# Patient Record
Sex: MALE | Race: BLACK OR AFRICAN AMERICAN | Employment: FULL TIME | URBAN - METROPOLITAN AREA
[De-identification: names, ages, dates, MRNs, and addresses within clinical notes are randomized per-mention and may not be internally consistent; named-entity substitution may affect disease eponyms.]

---

## 2024-03-12 ENCOUNTER — APPOINTMENT (OUTPATIENT)
Dept: GENERAL RADIOLOGY | Age: 24
End: 2024-03-12

## 2024-03-12 ENCOUNTER — HOSPITAL ENCOUNTER (EMERGENCY)
Age: 24
Discharge: HOME | End: 2024-03-12
Attending: EMERGENCY MEDICINE

## 2024-03-12 VITALS
SYSTOLIC BLOOD PRESSURE: 124 MMHG | HEART RATE: 87 BPM | OXYGEN SATURATION: 98 % | WEIGHT: 178 LBS | BODY MASS INDEX: 29.66 KG/M2 | HEIGHT: 65 IN | TEMPERATURE: 98.8 F | DIASTOLIC BLOOD PRESSURE: 86 MMHG | RESPIRATION RATE: 16 BRPM

## 2024-03-12 DIAGNOSIS — R05.9 COUGH, UNSPECIFIED TYPE: Primary | ICD-10-CM

## 2024-03-12 LAB
FLUAV RNA SPEC QL NAA+PROBE: NOT DETECTED
FLUBV RNA SPEC QL NAA+PROBE: NOT DETECTED
SARS-COV-2 RDRP RESP QL NAA+PROBE: NOT DETECTED
SOURCE: NORMAL

## 2024-03-12 PROCEDURE — 87502 INFLUENZA DNA AMP PROBE: CPT

## 2024-03-12 PROCEDURE — 87635 SARS-COV-2 COVID-19 AMP PRB: CPT

## 2024-03-12 PROCEDURE — 71046 X-RAY EXAM CHEST 2 VIEWS: CPT

## 2024-03-12 PROCEDURE — 99284 EMERGENCY DEPT VISIT MOD MDM: CPT

## 2024-03-12 RX ORDER — BENZONATATE 100 MG/1
100 CAPSULE ORAL 3 TIMES DAILY PRN
Qty: 30 CAPSULE | Refills: 0 | Status: SHIPPED | OUTPATIENT
Start: 2024-03-12 | End: 2024-03-12

## 2024-03-12 RX ORDER — ALBUTEROL SULFATE 90 UG/1
2 AEROSOL, METERED RESPIRATORY (INHALATION) EVERY 6 HOURS PRN
Qty: 18 G | Refills: 3 | Status: SHIPPED | OUTPATIENT
Start: 2024-03-12

## 2024-03-12 RX ORDER — BENZONATATE 100 MG/1
100 CAPSULE ORAL 3 TIMES DAILY PRN
Qty: 30 CAPSULE | Refills: 0 | Status: SHIPPED | OUTPATIENT
Start: 2024-03-12 | End: 2024-03-22

## 2024-03-12 ASSESSMENT — PAIN SCALES - GENERAL: PAINLEVEL_OUTOF10: 8

## 2024-03-12 ASSESSMENT — LIFESTYLE VARIABLES
HOW MANY STANDARD DRINKS CONTAINING ALCOHOL DO YOU HAVE ON A TYPICAL DAY: PATIENT DOES NOT DRINK
HOW OFTEN DO YOU HAVE A DRINK CONTAINING ALCOHOL: NEVER

## 2024-03-12 ASSESSMENT — PAIN - FUNCTIONAL ASSESSMENT: PAIN_FUNCTIONAL_ASSESSMENT: 0-10

## 2024-03-12 NOTE — DISCHARGE INSTRUCTIONS
Take cough medication as prescribed.  Follow-up with primary care physician.  Return to ED if symptoms worsen or progress in any way.  Take over-the-counter decongestant medication such as Mucinex DM.

## 2024-03-12 NOTE — ED NOTES
I have reviewed discharge instructions with the patient.  The patient verbalized understanding.    Patient left ED via Discharge Method: ambulatory to Home with SELF    Opportunity for questions and clarification provided.       Patient given 2 scripts.         To continue your aftercare when you leave the hospital, you may receive an automated call from our care team to check in on how you are doing.  This is a free service and part of our promise to provide the best care and service to meet your aftercare needs.” If you have questions, or wish to unsubscribe from this service please call [phone].  Thank you for Choosing our [facility] Emergency Department.

## 2024-03-12 NOTE — ED TRIAGE NOTES
Pt c/o Sob and throat pain for several days. When patient wakes up in the morning his throat hurts. Pt states that swallowing fluids \"takes my breath away.\" Respirations even and unlabored in triage. Pt was seen at [place] on Sunday for similar symptoms and syncope.

## 2024-03-13 ASSESSMENT — ENCOUNTER SYMPTOMS
WHEEZING: 1
COUGH: 1
CHEST TIGHTNESS: 0
STRIDOR: 0
NAUSEA: 0
BACK PAIN: 0
VOICE CHANGE: 0
TROUBLE SWALLOWING: 0
RHINORRHEA: 0
VOMITING: 0
ABDOMINAL PAIN: 0

## 2024-03-13 NOTE — ED PROVIDER NOTES
history.     Social History     Socioeconomic History    Marital status:      Spouse name: None    Number of children: None    Years of education: None    Highest education level: None   Tobacco Use    Smoking status: Never    Smokeless tobacco: Never   Substance and Sexual Activity    Alcohol use: Never    Drug use: Never        Discharge Medication List as of 3/12/2024  1:28 PM           Results for orders placed or performed during the hospital encounter of 03/12/24   COVID-19, Rapid    Specimen: Nasopharyngeal   Result Value Ref Range    Source Nasopharyngeal      SARS-CoV-2, Rapid Not detected NOTD     Influenza A/B, Molecular    Specimen: Nasopharyngeal   Result Value Ref Range    Influenza A, MER Not detected NOTD      Influenza B, MER Not detected NOTD     XR CHEST (2 VW)    Narrative    Chest X-ray    INDICATION: Productive cough.    COMPARISON:  None    TECHNIQUE: PA and lateral views of the chest were obtained.    FINDINGS: The lungs are clear. There are no infiltrates or effusions.  The heart  size is normal.  The bony thorax is intact.        Impression    No acute findings in the chest           XR CHEST (2 VW)   Final Result   No acute findings in the chest                      Recent Labs     03/12/24  1238   COVID19 Not detected       Voice dictation software was used during the making of this note.  This software is not perfect and grammatical and other typographical errors may be present.  This note has not been completely proofread for errors.     Ruslan Esposito Jr., MD  03/13/24 [id number]